# Patient Record
(demographics unavailable — no encounter records)

---

## 2024-11-01 NOTE — HISTORY OF PRESENT ILLNESS
[No falls in past year] : Patient reported no falls in the past year [Completely Independent] : Completely independent. [NO] : No [0] : 2) Feeling down, depressed, or hopeless: Not at all (0) [PHQ-2 Negative - No further assessment needed] : PHQ-2 Negative - No further assessment needed [FreeTextEntry1] : Mrs. Wetzel is a 73 yo F coming from home lives with  Ignacio [phone]. History obtained from patient.  Ambulates without assistance. Has Hx of PMH of hypothyroidism, AML/leukemia dx  (WBC at 24 at the time) (s/p chemo and now in remission), BPPV for ENT, HTN, HLD, GERD, obesity and insomnia.   She comes today as an initial visit to establish care and hopefully decrease amount of specialist she sees. She was concerned about recent blood work and elevated MCV she was advised to have folate + B12 checked. She also wishes to check uric acid due to hx of gout, last gout attack 1 year ago.   # HTN - managed with meds, taking valsartan 80mg daily - slightly elevated K level in the past, last blood work stable  # GERD  - taking Pantoprazole 40mg daily with improvement of symptoms   # Hx of AML/Leukemia/ iron overload post transfusions received phlebotomies - was following with hematology Dr. Vaughn was told only need to see on PRN basis  # Hypothyroidism - Taking Levothyroxine 75mcg daily - she f/u with endo for thyroid monitoring as well   # HLD - She has elevated cholesterol, currently controlled with Crestor 5mg - reports unable to tolerated higher doses due to arm and back cramping  - following with Dr. Laughlin.  # Anxiety - She has hx of anxiety since she was very young  - is following with psychiatry and taking Xanax 0.5mg twice daily as needed - was offed talk therapy and is thinking about it   # Obesity - was started pt on Ozempic but with no improvement on weight loss as per pt and unable to tolerated due to nausea  - was changed to Mounjaro 2 months ago, current dose 7.5mg started last week.   # BPV - has tried vestibular therapy - following with ENT   # Insomnia - takes Ambien at night - was recently started on Trazodone by psychiatry - she reports snoring at night.   Surgical Hx: breast biopsy- benign, vaginal polyps Family Hx: mother-  from DM; father-  from bladder cancer; sister- lung cancer  Brief Social History: Retired  Living situation: lives with  Ignacio  Tobacco Use: quit age 30, 2 ppd x 15 years= 30 + year smoking cancer EtOH/Drug use: Denies  Colonoscopy Dec 2020  ENT Dr. Zamorano Hematology Dr. Vaughn  Gastroenterology Dr. Reese Endocrinologist Dr. Leah Elliott Cardiologist Dr. Alvarez  Psychiatry Dr. Russell   She has one daughter Kiana [phone] lives in [place]  [[id number]] : 0

## 2024-11-01 NOTE — ASSESSMENT
[FreeTextEntry1] : - ordered repeat blood work - medication reconciliation completed - requested records of mammography, carotid and thyroid US and last colonoscopy for review - f/u in 3 months or earlier if needed

## 2025-02-05 NOTE — HISTORY OF PRESENT ILLNESS
[No falls in past year] : Patient reported no falls in the past year [Completely Independent] : Completely independent. [NO] : No [0] : 2) Feeling down, depressed, or hopeless: Not at all (0) [PHQ-2 Negative - No further assessment needed] : PHQ-2 Negative - No further assessment needed [FreeTextEntry1] : Mrs. Wetzel is a 71 yo F coming from home lives with  Ignacio [phone]. History obtained from patient.  Ambulates without assistance. Has Hx of PMH of hypothyroidism, AML/leukemia dx  (WBC at 24 at the time) (s/p chemo and now in remission), BPPV for ENT, HTN, HLD, GERD, obesity and insomnia.   She comes today for a follow up visit, since last visit she has been in overall good state of health. She is not interested in doing sleep study, but is interested in looking for an alternative to manage her insomnia. She reports the main problem is going to sleep not staying asleep. She has been taking Ambien 10mg and Trazodone 50mg daily. She reports no change since Trazodone has been added.   She tries to limit xanax use to once a day and only if she has severe anxiety. She continues to follow with psychiatry.   She is also interested in coming off gout medicine as she has not had a gout attack for more than 1 year, she will discuss this with rheumatologist.   # HTN - managed with meds, taking valsartan 80mg daily - slightly elevated K level in the past, last blood work stable  # GERD  - taking Pantoprazole 40mg daily with improvement of symptoms   # Hx of AML/Leukemia/ iron overload post transfusions received phlebotomies - was following with hematology Dr. Vaughn was told only need to see on PRN basis  # Hypothyroidism - Taking Levothyroxine 75mcg daily - she f/u with endo for thyroid monitoring as well   # HLD - She has elevated cholesterol, currently controlled with Crestor 5mg - reports unable to tolerated higher doses due to arm and back cramping  - following with Dr. Laughlin.  # Anxiety - She has hx of anxiety since she was very young  - is following with psychiatry and taking Xanax 0.5mg twice daily as needed - was offered talk therapy and is thinking about it   # Obesity - was started pt on Ozempic but with no improvement on weight loss as per pt and unable to tolerated due to nausea  - was changed to Mounjaro 2 months ago, current dose 7.5mg started last week.   # BPV - has tried vestibular therapy - following with ENT   # Insomnia - takes Ambien at night - was recently started on Trazodone by psychiatry - she reports snoring at night.   Surgical Hx: breast biopsy- benign, vaginal polyps Family Hx: mother-  from DM; father-  from bladder cancer; sister- lung cancer  Brief Social History: Retired  Living situation: lives with  Ignacio  Tobacco Use: quit age 30, 2 ppd x 15 years= 30 + year smoking cancer EtOH/Drug use: Denies  Colonoscopy Dec 2020  ENT Dr. Zamorano Hematology Dr. Vaughn  Gastroenterology Dr. Reese Endocrinologist Dr. Leah Elliott Cardiologist Dr. Alvarez  Psychiatry Dr. Russell   She has one daughter Kiana [phone] lives in [place]  [[id number]] : 0

## 2025-02-05 NOTE — HISTORY OF PRESENT ILLNESS
[No falls in past year] : Patient reported no falls in the past year [Completely Independent] : Completely independent. [NO] : No [0] : 2) Feeling down, depressed, or hopeless: Not at all (0) [PHQ-2 Negative - No further assessment needed] : PHQ-2 Negative - No further assessment needed [FreeTextEntry1] : Mrs. Wetzel is a 71 yo F coming from home lives with  Ignacio [phone]. History obtained from patient.  Ambulates without assistance. Has Hx of PMH of hypothyroidism, AML/leukemia dx  (WBC at 24 at the time) (s/p chemo and now in remission), BPPV for ENT, HTN, HLD, GERD, obesity and insomnia.   She comes today for a follow up visit, since last visit she has been in overall good state of health. She is not interested in doing sleep study, but is interested in looking for an alternative to manage her insomnia. She reports the main problem is going to sleep not staying asleep. She has been taking Ambien 10mg and Trazodone 50mg daily. She reports no change since Trazodone has been added.   She tries to limit xanax use to once a day and only if she has severe anxiety. She continues to follow with psychiatry.   She is also interested in coming off gout medicine as she has not had a gout attack for more than 1 year, she will discuss this with rheumatologist.   # HTN - managed with meds, taking valsartan 80mg daily - slightly elevated K level in the past, last blood work stable  # GERD  - taking Pantoprazole 40mg daily with improvement of symptoms   # Hx of AML/Leukemia/ iron overload post transfusions received phlebotomies - was following with hematology Dr. Vaughn was told only need to see on PRN basis  # Hypothyroidism - Taking Levothyroxine 75mcg daily - she f/u with endo for thyroid monitoring as well   # HLD - She has elevated cholesterol, currently controlled with Crestor 5mg - reports unable to tolerated higher doses due to arm and back cramping  - following with Dr. Laughlin.  # Anxiety - She has hx of anxiety since she was very young  - is following with psychiatry and taking Xanax 0.5mg twice daily as needed - was offered talk therapy and is thinking about it   # Obesity - was started pt on Ozempic but with no improvement on weight loss as per pt and unable to tolerated due to nausea  - was changed to Mounjaro 2 months ago, current dose 7.5mg started last week.   # BPV - has tried vestibular therapy - following with ENT   # Insomnia - takes Ambien at night - was recently started on Trazodone by psychiatry - she reports snoring at night.   Surgical Hx: breast biopsy- benign, vaginal polyps Family Hx: mother-  from DM; father-  from bladder cancer; sister- lung cancer  Brief Social History: Retired  Living situation: lives with  Ignacio  Tobacco Use: quit age 30, 2 ppd x 15 years= 30 + year smoking cancer EtOH/Drug use: Denies  Colonoscopy Dec 2020  ENT Dr. Zamorano Hematology Dr. Vaughn  Gastroenterology Dr. Reese Endocrinologist Dr. Leah Elliott Cardiologist Dr. Alvarez  Psychiatry Dr. Russell   She has one daughter Kiana [phone] lives in [place]  [[id number]] : 0

## 2025-02-05 NOTE — ASSESSMENT
[FreeTextEntry1] : - ordered repeat blood work - medication reconciliation completed - scanned records of mammography, carotid and thyroid US and last colonoscopy for review - f/u in 3 months or earlier if needed

## 2025-02-12 NOTE — DISCUSSION/SUMMARY
[FreeTextEntry1] : glucose intolerance/BMI 33/HTN/HLD  -  pt to [id number] once weekly - A detailed discussion took place about the importance of CV risk reduction   - The patient understands the importance of incorporating moderate aerobic exercise, 4 times/week for 40 minutes to reduce risk of CV disease. - A detailed discussion of lifestyle modification was done today. Patient understands the importance of a heart healthy diet and incorporating veggies/legumes/fruits/whole grains and limiting processed foods, sugars and carbs in their diet. - patient understands that stress reduction along with good sleep hygiene will help aid in weight loss  - Follow up in 12 weeks   - The patient has a good understanding of the diagnosis, treatment plan and lifestyle modification.  she will contact me at the office for any questions with their care or any changes in their health status.  Case discussed with Dr Kingsley Maldonado   no

## 2025-02-12 NOTE — DISCUSSION/SUMMARY
[FreeTextEntry1] : glucose intolerance/BMI 33/HTN/HLD  -  pt to [id number] once weekly - A detailed discussion took place about the importance of CV risk reduction   - The patient understands the importance of incorporating moderate aerobic exercise, 4 times/week for 40 minutes to reduce risk of CV disease. - A detailed discussion of lifestyle modification was done today. Patient understands the importance of a heart healthy diet and incorporating veggies/legumes/fruits/whole grains and limiting processed foods, sugars and carbs in their diet. - patient understands that stress reduction along with good sleep hygiene will help aid in weight loss  - Follow up in 12 weeks   - The patient has a good understanding of the diagnosis, treatment plan and lifestyle modification.  she will contact me at the office for any questions with their care or any changes in their health status.  Case discussed with Dr Kingsley Maldonado

## 2025-04-08 NOTE — HISTORY OF PRESENT ILLNESS
[FreeTextEntry1] : 73 yo F with a h/o AML in remission, obesity, HTN, HLD, glucose intolerance, GERD, hypothyroidism presents for evaluation of chronic insomnia. PMD: Dr. Jolynn Conte Psychiatry Dr. Russell  Insomnia history  States she was a good sleeper earlier in life Started Ambien 5 mg in her 40s- was prescribed by her Gyn, denies a specific precipitating event.  Underlying anxiety her whole life, takes Xanax prn, however, never on a maintenance anxiolytic. Was also treated with Ambien CR and Ambien 10 mg, the latter of which is what she takes now.  Insomnia has worsened in the past 10 years and she associates the acute worsening while taking Mounjaro Past 4-6 months - Taking Ambien 10mg and Trazodone 50mg daily, was also given Xanax as needed. This was prescribed by her Psychiatrist Dr. Russell.   Main complaint is difficulty initiating sleep Feels tired around 10 pm Goes to bed at 11 pm Takes Ambien 10 mg and Trazodone 50 mg between 11p-12a Watches TV in bed for 1 hour, turns off at 12:30 am  Lays in bed and is awake, trying to sleep until 1 am-3 am, sometimes doesn't fall asleep until later.  If she can't sleep by 1-2 am, will take another 50 mg of Trazodone. Gets up at 7:15 and goes to the gym. Does not feel sleepy in the morning or during the day.    Not tired during the day, doesn't nap.  ESS 1    [Primary Insomnia] : primary insomnia

## 2025-04-08 NOTE — CONSULT LETTER
[Dear  ___] : Dear  [unfilled], [Consult Letter:] : I had the pleasure of evaluating your patient, [unfilled]. [Please see my note below.] : Please see my note below. [Consult Closing:] : Thank you very much for allowing me to participate in the care of this patient.  If you have any questions, please do not hesitate to contact me. [Sincerely,] : Sincerely, [FreeTextEntry2] : Dr. Jolynn Conte [FreeTextEntry3] : Nargis Nunez MD, FAASM  of Medicine Associate , Fellowship in Pulmonary and Critical Care Medicine Division of Pulmonary, Critical Care & Sleep Medicine [employer] [facility] [employer] at [facility]

## 2025-04-08 NOTE — CONSULT LETTER
[Dear  ___] : Dear  [unfilled], [Consult Letter:] : I had the pleasure of evaluating your patient, [unfilled]. [Please see my note below.] : Please see my note below. [Consult Closing:] : Thank you very much for allowing me to participate in the care of this patient.  If you have any questions, please do not hesitate to contact me. [Sincerely,] : Sincerely, [FreeTextEntry2] : Dr. Jolynn Conte [FreeTextEntry3] : Nargis Nunez MD, FAASM  of Medicine Associate , Fellowship in Pulmonary and Critical Care Medicine Division of Pulmonary, Critical Care & Sleep Medicine [employer] [facility] [employer] at [facility]

## 2025-04-08 NOTE — PHYSICAL EXAM
[General Appearance - Well Developed] : well developed [General Appearance - Well Nourished] : well nourished [General Appearance - In No Acute Distress] : no acute distress [Normal Conjunctiva] : the conjunctiva exhibited no abnormalities [Eyelids - No Xanthelasma] : the eyelids demonstrated no xanthelasmas [Low Lying Soft Palate] : low lying soft palate [II] : II [Neck Appearance] : the appearance of the neck was normal [Heart Rate And Rhythm] : heart rate was normal and rhythm regular [Heart Sounds] : normal S1 and S2 [Murmurs] : no murmurs [] : no respiratory distress [Respiration, Rhythm And Depth] : normal respiratory rhythm and effort [Exaggerated Use Of Accessory Muscles For Inspiration] : no accessory muscle use [Auscultation Breath Sounds / Voice Sounds] : lungs were clear to auscultation bilaterally [Nail Clubbing] : no clubbing of the fingernails [Cyanosis, Localized] : no localized cyanosis [Skin Color & Pigmentation] : normal skin color and pigmentation [Oriented To Time, Place, And Person] : oriented to person, place, and time [Impaired Insight] : insight and judgment were intact

## 2025-04-08 NOTE — REVIEW OF SYSTEMS
[Obesity] : obesity [Anxious] : anxious [Difficulty Initiating Sleep] : difficulty falling asleep [Difficulty Maintaining Sleep] : no difficulty maintaining sleep [Lower Extremity Discomfort] : no lower extremity discomfort [Unusual Sleep Behavior] : no unusual sleep behavior [Hypersomnolence] : not sleeping much more than usual [Negative] : Neurologic [FreeTextEntry3] : per hpi

## 2025-04-08 NOTE — ASSESSMENT
[FreeTextEntry1] : 71 yo F with a h/o AML in remission, obesity, HTN, HLD, glucose intolerance, GERD, hypothyroidism who presents for initial evaluation of chronic insomnia.   Plan:  The goal is to wean from Ambien as she feels it is not helping much and to transition to another hypnotic. Also discussed the importance of behavioral techniques and the role of CBTi for the treatment of chronic insomnia.  Ideal sleep schedule is 11 pm- 7/ 8 am Discussed sleep hygiene and sleep restriction techniques. Recommend starting bedtime routine 1.5 hours prior to bedtime, taking Ambien and Trazodone 30 min prior to bedtime, with the goal of being sleepy and getting into bed at 12 am.  All relaxation to be done outside of the bedroom. No electronics within 2 hours of bedtime.   Also discussed that underlying anxiety is likely playing a role and to readdress treating her anxiety with CBT and possibly medication such as an SSRI and not a benzodiazepine if possible, to avoid polypharmacy with hypnotics and benzos.  For now, she will decrease the Ambien to 7.5 mg (will slowly taper off to reduce the risk of rebound insomnia) and increase Trazodone to 100 mg.  Refer to Dr. Gutierrez for CBTi The patient is in agreement and will call to schedule.

## 2025-05-19 NOTE — HISTORY OF PRESENT ILLNESS
[No falls in past year] : Patient reported no falls in the past year [Completely Independent] : Completely independent. [NO] : No [0] : 2) Feeling down, depressed, or hopeless: Not at all (0) [PHQ-2 Negative - No further assessment needed] : PHQ-2 Negative - No further assessment needed [FreeTextEntry1] : Mrs. Wetzel is a 71 yo F coming from home lives with  Ignacio [phone]. History obtained from patient.  Ambulates without assistance. Has Hx of PMH of hypothyroidism, AML/leukemia dx  (WBC at 24 at the time) (s/p chemo and now in remission), BPPV for ENT, HTN, HLD, GERD, anxiety, obesity and insomnia.   She comes today for a follow up visit, since last visit she has been in overall good state of health. She was seen by pain specialist due to problems with sleep, she reports the main problem is going to sleep not staying asleep. She was advised to increase Trazodone dose from 50 to 100mg at night to help come off Ambien.   She tries to limit xanax use to once a day and only if she has severe anxiety. She continues to follow with psychiatry but is in the process of changing to a new psychiatrist.   She is also interested in removing Allopurinol as she has not had a gout attack for more than 1 year, she will discuss this with rheumatologist.   # HTN - managed with meds, taking valsartan 80mg daily - slightly elevated K level in the past, last blood work stable  # GERD  - taking Pantoprazole 40mg daily with improvement of symptoms   # Hx of AML/Leukemia/ iron overload post transfusions received phlebotomies - was following with hematology Dr. Vaughn was told only need to see on PRN basis  # Hypothyroidism - Taking Levothyroxine 75mcg daily - she f/u with endo for thyroid monitoring as well   # HLD - She has elevated cholesterol, currently controlled with Crestor 5mg - reports unable to tolerated higher doses due to arm and back cramping  - following with Dr. Laughlin.  # Anxiety - She has hx of anxiety since she was very young  - is following with psychiatry and taking Xanax 0.5mg twice daily as needed - was offered talk therapy and is thinking about it   # Obesity - continues to take Mounjaro current dose 12.5mg.  - continues to lose weight  # BPV - has tried vestibular therapy - following with ENT   # Insomnia - takes Ambien at night - taking Trazodone 50 and sometimes 100mg at night.   Surgical Hx: breast biopsy- benign, vaginal polyps Family Hx: mother-  from DM; father-  from bladder cancer; sister- lung cancer  Brief Social History: Retired  Living situation: lives with  Ignacio  Tobacco Use: quit age 30, 2 ppd x 15 years= 30 + year smoking cancer EtOH/Drug use: Denies  Colonoscopy Dec 2020  ENT Dr. Zamorano Hematology Dr. Vaughn  Gastroenterology Dr. Reese Endocrinologist Dr. Leah Elliott Cardiologist Dr. Alvarez  Psychiatry Dr. Russell   She has one daughter Kiana [phone] lives in [place]  [[id number]] : 0

## 2025-05-19 NOTE — HISTORY OF PRESENT ILLNESS
[No falls in past year] : Patient reported no falls in the past year [Completely Independent] : Completely independent. [NO] : No [0] : 2) Feeling down, depressed, or hopeless: Not at all (0) [PHQ-2 Negative - No further assessment needed] : PHQ-2 Negative - No further assessment needed [FreeTextEntry1] : Mrs. Wetzel is a 73 yo F coming from home lives with  Ignacio [phone]. History obtained from patient.  Ambulates without assistance. Has Hx of PMH of hypothyroidism, AML/leukemia dx  (WBC at 24 at the time) (s/p chemo and now in remission), BPPV for ENT, HTN, HLD, GERD, anxiety, obesity and insomnia.   She comes today for a follow up visit, since last visit she has been in overall good state of health. She was seen by pain specialist due to problems with sleep, she reports the main problem is going to sleep not staying asleep. She was advised to increase Trazodone dose from 50 to 100mg at night to help come off Ambien.   She tries to limit xanax use to once a day and only if she has severe anxiety. She continues to follow with psychiatry but is in the process of changing to a new psychiatrist.   She is also interested in removing Allopurinol as she has not had a gout attack for more than 1 year, she will discuss this with rheumatologist.   # HTN - managed with meds, taking valsartan 80mg daily - slightly elevated K level in the past, last blood work stable  # GERD  - taking Pantoprazole 40mg daily with improvement of symptoms   # Hx of AML/Leukemia/ iron overload post transfusions received phlebotomies - was following with hematology Dr. Vaughn was told only need to see on PRN basis  # Hypothyroidism - Taking Levothyroxine 75mcg daily - she f/u with endo for thyroid monitoring as well   # HLD - She has elevated cholesterol, currently controlled with Crestor 5mg - reports unable to tolerated higher doses due to arm and back cramping  - following with Dr. Laughlin.  # Anxiety - She has hx of anxiety since she was very young  - is following with psychiatry and taking Xanax 0.5mg twice daily as needed - was offered talk therapy and is thinking about it   # Obesity - continues to take Mounjaro current dose 12.5mg.  - continues to lose weight  # BPV - has tried vestibular therapy - following with ENT   # Insomnia - takes Ambien at night - taking Trazodone 50 and sometimes 100mg at night.   Surgical Hx: breast biopsy- benign, vaginal polyps Family Hx: mother-  from DM; father-  from bladder cancer; sister- lung cancer  Brief Social History: Retired  Living situation: lives with  Ignacio  Tobacco Use: quit age 30, 2 ppd x 15 years= 30 + year smoking cancer EtOH/Drug use: Denies  Colonoscopy Dec 2020  ENT Dr. Zamorano Hematology Dr. Vaughn  Gastroenterology Dr. Reese Endocrinologist Dr. Leah Elliott Cardiologist Dr. Alvarez  Psychiatry Dr. Russell   She has one daughter Kiana [phone] lives in [place]  [[id number]] : 0

## 2025-06-12 NOTE — HISTORY OF PRESENT ILLNESS
[FreeTextEntry1] : Tabatha Wetzel is a 73 yo F c/o "taking Ambien for over 30 years, use to take it here and there...Dr. Conte would like me to get off Ambien but I don't know if that's gonna happen." She was referred by Dr. Nunez for CBTi.  Hx: Her main complaint is difficulty falling asleep. She describes a longstanding hx of going to bed later and sleeping in till 9-11 am.  As a child, she was "fearful of the night" and recalls as a teenager and in her 20's, would stay up till 1-2 am but describes herself as a "good sleeper". Sleep issues began in her 40's and she was initially Rx Ambien 10 mg which she took sporadically, by her gynolcologist. At some point, she began taking Ambien 10 mg nightly and was recently Rx Trazodone 50 mg but does not find it effective on it's own and takes "a mixture" of Ambien 5-10 mg and trazodone. Was also treated with Ambien CR and Ambien 10 mg, the latter of which is what she takes now. Insomnia has worsened in the past 10 years and she associates the acute worsening while taking Mounjaro Past 4-6 months - Taking Ambien 10mg and Trazodone 50mg daily, was also given Xanax as needed. This was prescribed by her Psychiatrist Dr. Russell but she recently switched to Dr. Monk. . Underlying anxiety her whole life, takes Xanax prn, however, never on a maintenance anxiolytic.  She has h/o AML in remission, obesity, HTN, HLD, glucose intolerance, GERD, hypothyroidism   Main complaint is difficulty initiating sleep Feels tired around 10 pm Goes to bed at 11 pm Takes Ambien 10 mg and Trazodone 50 mg between 11p-12a Watches TV in bed for 1 hour, turns off at 12:30 am Lays in bed and is awake, trying to sleep until 1 am-3 am, sometimes doesn't fall asleep until later. If she can't sleep by 1-2 am, will take another 50 mg of Trazodone. Gets up at 7:15 and goes to the gym. Does not feel sleepy in the morning or during the day. Not tired during the day, doesn't nap. ESS 1    The patient is a 72 year old female with a history of primary insomnia.

## 2025-07-08 NOTE — DISCUSSION/SUMMARY
[FreeTextEntry1] : glucose intolerance/BMI 33/HTN/HLD  -  pt to [id number] once weekly - A detailed discussion took place about the importance of CV risk reduction   - The patient understands the importance of incorporating moderate aerobic exercise, 4 times/week for 40 minutes to reduce risk of CV disease. - A detailed discussion of lifestyle modification was done today. Patient understands the importance of a heart healthy diet and incorporating veggies/legumes/fruits/whole grains and limiting processed foods, sugars and carbs in their diet. - patient understands that stress reduction along with good sleep hygiene will help aid in weight loss  - Follow up in 12 weeks   - The patient has a good understanding of the diagnosis, treatment plan and lifestyle modification.  she will contact me at the office for any questions with their care or any changes in their health status.  Case discussed with Dr Kingsley Maldonado

## 2025-07-08 NOTE — REASON FOR VISIT
[Home] : at home, [unfilled] , at the time of the visit. [Medical Office: ([facility])___] : at the medical office located in  [Patient] : the patient

## 2025-07-10 NOTE — HISTORY OF PRESENT ILLNESS
[FreeTextEntry1] : Tabatha Wetzel is a 71 yo F c/o "taking Ambien for over 30 years"...Dr. Conte would like me to get off Ambien but I don't know if that's gonna happen." She was referred by Dr. Nunez for CBTi. Her main complaint is difficulty falling asleep. Hx: Her main complaint is difficulty falling asleep. She describes a longstanding hx of going to bed later and sleeping in till 9-11 am. As a child, she was "fearful of the night" and recalls as a teenager and in her 20's, would stay up till 1-2 am but describes herself as a "good sleeper".  Sleep issues began in her 40's and she was initially Rx Ambien 10 mg by her gynocologist, which she initially took sporadically but has been taking nightly for at least 20-25 years. Recently Rx Trazodone 50 mg but does not find it effective on it's own and takes "a mixture" of Ambien 5-10 mg and trazodone. Was also treated with Ambien CR and Ambien 10 mg, the latter of which is what she takes now. Insomnia has worsened in the past 10 years and she associates the acute worsening while taking Mounjaro for the past 4-6 months. This was prescribed by her Psychiatrist Dr. Russell but she recently switched to Dr. Taylor. Describes underlying anxiety her whole life, takes Xanax 0.5 mg prn, however, never on a maintenance anxiolytic. She has h/o AML in remission, obesity, HTN, HLD, glucose intolerance, GERD, hypothyroidism SW schedule: Gets into bed 10-11 pm, Ambien 10 mg and Trazodone 50 mg between 11p-12a, to watch Seinfeld till sleepy, turns off at 12:30 am, falls asleep within 30 mins usually, best sleep 4-7 am, Gets up at 7:15 and goes to the gym. Does not feel sleepy in the morning or during the day. Without medications, lays in bed awake, trying to sleep until 1 am-3 am, sometimes doesn't fall asleep until later. If she can't sleep by 1-2 am, will take another 50 mg of Trazodone. Denies daytime sleepiness, doesn't nap. ESS 1 Admits to heavy breathing noticed by daughter but has not had a sleep study.

## 2025-07-10 NOTE — PHYSICAL EXAM
[General Appearance - Well Developed] : well developed [Normal Appearance] : normal appearance [Well Groomed] : well groomed [General Appearance - Well Nourished] : well nourished [Oriented To Time, Place, And Person] : oriented to person, place, and time [Impaired Insight] : insight and judgment were intact [Affect] : the affect was normal [Mood] : the mood was normal [FreeTextEntry1] : moderate anxiety symptoms manifested by rumination and worry about sleep and d/c Ambien, racing thoughts, pressured speech

## 2025-07-10 NOTE — ASSESSMENT
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 Described recent visit with new psychiatrist Dr. Taylor- she was concerned that he d/c Ambien Rx and increased Trazodone 150 mg and Xanax 0.5 mg, prn. Pt tried last night to d/c Ambien and describes "a terrible night". Describes getting into bed ~10:30 pm but wasn't sleepy "it was way too early... my body wanted it". Realizes that she needs to something else and get into bed later, when she feels sleepy.  She did not fill out sleep logs - Ambien 10:30 pm, bedtime usually ~ 11 pm unless body is tired than earlier, watches TV till ~12-12:30, SOL 10 min to 1 hour, awakens ~7 am, TST ~6 hours.  In depth discussion re: sleep physiology- two process model of sleep, sleep architecture- focusing on pt's likely tendency to have delayed sleep phase (given current and longstanding tendency). Discussed rationale and protocol for CBTi-sleep restriction, stimulus control and DSPS- melatonin 2-3 mg XR- 10:30 pm, Ambien as per RX 11:30 pm, delay getting into bed 12-12:30, out of bed 7-7:30 am. light exposure. Discussed case with Dr. Nunez and recommended pt to sched with Dr. Nunez for medication management. f/u 3-4 weeks.

## 2025-07-15 NOTE — REASON FOR VISIT
[Adequate] : adequate [Home] : at home, [unfilled] , at the time of the visit. [Use of Plain Language] : use of plain language [Medical Office: ([facility])___] : at the medical office located in  [None] : none [Patient] : the patient [] : I have reviewed management goals with caretaker and provided a copy of care plan

## 2025-07-16 NOTE — DISCUSSION/SUMMARY
[FreeTextEntry1] : glucose intolerance/BMI 33/HTN/HLD  -  pt to [id number] once weekly - A detailed discussion took place about the importance of CV risk reduction   - The patient understands the importance of incorporating moderate aerobic exercise, 4 times/week for 40 minutes to reduce risk of CV disease. - A detailed discussion of lifestyle modification was done today. Patient understands the importance of a heart healthy diet and incorporating veggies/legumes/fruits/whole grains and limiting processed foods, sugars and carbs in their diet. - patient understands that stress reduction along with good sleep hygiene will help aid in weight loss  - Follow up in 12 weeks   - The patient has a good understanding of the diagnosis, treatment plan and lifestyle modification.  she will contact me at the office for any questions with their care or any changes in their health status.  Case discussed with Dr Kingsley Maldonado

## 2025-07-16 NOTE — DISCUSSION/SUMMARY
[FreeTextEntry1] : glucose intolerance/BMI 33/HTN/HLD  -  pt to [id number] once weekly - A detailed discussion took place about the importance of CV risk reduction   - The patient understands the importance of incorporating moderate aerobic exercise, 4 times/week for 40 minutes to reduce risk of CV disease. - A detailed discussion of lifestyle modification was done today. Patient understands the importance of a heart healthy diet and incorporating veggies/legumes/fruits/whole grains and limiting processed foods, sugars and carbs in their diet. - patient understands that stress reduction along with good sleep hygiene will help aid in weight loss  - Follow up in 12 weeks   - The patient has a good understanding of the diagnosis, treatment plan and lifestyle modification.  she will contact me at the office for any questions with their care or any changes in their health status.  Case discussed with Dr Kingsley Maldonado